# Patient Record
Sex: MALE | Race: WHITE | Employment: OTHER | URBAN - METROPOLITAN AREA
[De-identification: names, ages, dates, MRNs, and addresses within clinical notes are randomized per-mention and may not be internally consistent; named-entity substitution may affect disease eponyms.]

---

## 2018-07-24 ENCOUNTER — OFFICE VISIT (OUTPATIENT)
Dept: INTERNAL MEDICINE CLINIC | Facility: CLINIC | Age: 43
End: 2018-07-24

## 2018-07-24 VITALS
RESPIRATION RATE: 20 BRPM | TEMPERATURE: 98 F | SYSTOLIC BLOOD PRESSURE: 118 MMHG | BODY MASS INDEX: 25.78 KG/M2 | WEIGHT: 194.5 LBS | DIASTOLIC BLOOD PRESSURE: 78 MMHG | HEIGHT: 73 IN | HEART RATE: 72 BPM

## 2018-07-24 DIAGNOSIS — Z00.00 ROUTINE GENERAL MEDICAL EXAMINATION AT A HEALTH CARE FACILITY: Primary | ICD-10-CM

## 2018-07-24 DIAGNOSIS — Z00.00 LABORATORY EXAMINATION ORDERED AS PART OF A ROUTINE GENERAL MEDICAL EXAMINATION: ICD-10-CM

## 2018-07-24 PROCEDURE — 99203 OFFICE O/P NEW LOW 30 MIN: CPT | Performed by: INTERNAL MEDICINE

## 2018-07-24 NOTE — PROGRESS NOTES
Vero Born  1975 is a 44year old male. Patient presents with:  Physical      HPI:   reestablish himself    No current outpatient prescriptions on file. No past medical history on file.    Social History:  Smoking status: Never Smoker urinary frequency , urinary incontinence/no history of kidney disease or genital abnormalities. no Dysuria. Nocturia None. Musculoskeletal:   Patient denies arthritis , back pain, muscle weakness . Joint pain none. Joint stiffness none.    Peripheral Vasc present. Tremors: no.   Varicose veins: absent . MUSCULOSKELETAL:   Cervical spines: normal.   L-S spines: normal.   Lower extremity joints: normal.   Upper extremity joints: normal.   NEUROLOGICAL:   Babinski: negative. All other reflexes are normal.

## 2018-07-31 ENCOUNTER — TELEPHONE (OUTPATIENT)
Dept: INTERNAL MEDICINE CLINIC | Facility: CLINIC | Age: 43
End: 2018-07-31

## 2018-08-03 ENCOUNTER — TELEPHONE (OUTPATIENT)
Dept: INTERNAL MEDICINE CLINIC | Facility: CLINIC | Age: 43
End: 2018-08-03

## 2018-08-03 LAB
A/G RATIO: 1.9
ALBUMIN: 4.6 G/DL
ALKALINE PHOSPHATASE: 53
ALT (SGPT): 16 IU/L
APPEARANCE: CLEAR
AST (SGOT): 18 IU/L
BILIRUBIN: 0.4
BILIRUBIN: NEGATIVE
BUN/CREATININE RATIO: 13
BUN: 14
CALCIUM: 9.5
CARBON DIOXIDE, TOTAL: 26 MMOL/L
CHLORIDE: 103
CREATININE: 1.06 MG/DL
EGFR IF AFRICN AM: 100
EGFR IF NONAFRICN AM: 86
GLOBULIN, TOTAL: 2.4 G/DL
GLUCOSE BLOOD: 93
GLUCOSE BLOOD: NEGATIVE
HCT: 41
HDL CHOLESTEROL: 67 MG/DL (ref 60–60)
HGB: 13.4
KETONES: NEGATIVE MMOL/L (ref 0–0.5)
LDL CHOLESTEROL: 116 MG/DL (ref ?–130)
MEAN CELL VOLUME: 87
MEAN CORPUSCULAR HEMOGLOBIN: 25.5
MEAN CORPUSCULAR HGB CONC: 32.7
NITRITE, URINE: NEGATIVE
OCCULT BLOOD: NEGATIVE
PH, URINE: 7.5 (ref 4.5–8)
PLT: 229
POTASSIUM: 5
PROTEIN, TOTAL: 7
PROTEIN: NEGATIVE
RED BLOOD COUNT: 4.71
RED CELL DISTRIBUTION WIDTH: 13.3
SODIUM: 142
SPECIFIC GRAVITY: 1.02
TEST STRIP EXPIRATION DATE: 0 DATE
TEST STRIP LOT #: 0 NUMERIC
TOTAL CHOLESTEROL: 200 MG/DL (ref ?–200)
TRG: 86 MG/DL (ref ?–150)
URINE-COLOR: YELLOW
UROBILINOGEN,SEMI-QN: 0.2 MG/DL (ref 0–1.9)
WBC ESTERASE: NEGATIVE
WBC: 5.7

## 2018-08-04 ENCOUNTER — TELEPHONE (OUTPATIENT)
Dept: INTERNAL MEDICINE CLINIC | Facility: CLINIC | Age: 43
End: 2018-08-04

## 2018-08-04 NOTE — TELEPHONE ENCOUNTER
Please let the patient know all his lab work was within acceptable limits LDL was borderline high at 116  Exercise and discretion  would help   no Need for medicine at this moment

## 2018-08-07 NOTE — TELEPHONE ENCOUNTER
Pt notified of lab results and provider recommendations. Pt stated he just started playing hockey so his numbers would improve. Pt to call office with any questions or concerns.